# Patient Record
Sex: MALE | Race: WHITE | HISPANIC OR LATINO | Employment: FULL TIME | URBAN - METROPOLITAN AREA
[De-identification: names, ages, dates, MRNs, and addresses within clinical notes are randomized per-mention and may not be internally consistent; named-entity substitution may affect disease eponyms.]

---

## 2020-03-25 ENCOUNTER — HOSPITAL ENCOUNTER (EMERGENCY)
Facility: HOSPITAL | Age: 25
Discharge: HOME | End: 2020-03-25
Attending: EMERGENCY MEDICINE
Payer: COMMERCIAL

## 2020-03-25 VITALS
OXYGEN SATURATION: 99 % | TEMPERATURE: 99 F | WEIGHT: 175 LBS | HEIGHT: 72 IN | DIASTOLIC BLOOD PRESSURE: 80 MMHG | HEART RATE: 80 BPM | RESPIRATION RATE: 18 BRPM | SYSTOLIC BLOOD PRESSURE: 137 MMHG | BODY MASS INDEX: 23.7 KG/M2

## 2020-03-25 DIAGNOSIS — T63.481A INSECT STINGS, ACCIDENTAL OR UNINTENTIONAL, INITIAL ENCOUNTER: Primary | ICD-10-CM

## 2020-03-25 PROCEDURE — 99282 EMERGENCY DEPT VISIT SF MDM: CPT

## 2020-03-25 RX ORDER — SULFAMETHOXAZOLE AND TRIMETHOPRIM 800; 160 MG/1; MG/1
1 TABLET ORAL 2 TIMES DAILY
Qty: 20 TABLET | Refills: 0 | Status: SHIPPED | OUTPATIENT
Start: 2020-03-25 | End: 2020-04-04

## 2020-03-25 NOTE — ED NOTES
Pt states that he was at work today and felt something bite him on the back of his scalp and that his boss told him that he saw a spider.

## 2020-03-25 NOTE — DISCHARGE INSTRUCTIONS
Bactrim as directed until all gone  You can take a Benadryl if you feel that your itching.  Return for any concerns.  The

## 2020-03-25 NOTE — ED PROVIDER NOTES
Encounter Date: 3/25/2020       History     Chief Complaint   Patient presents with    Abscess     back of head     25-year-old male presents to the emergency department reports that he was working inside he states that his boss told him that he noted a spider on his head he states that he felt a little bump to his scalp so became concerned.  Patient reports immunizations are up-to-date        Review of patient's allergies indicates:   Allergen Reactions    Clindamycin      Past Medical History:   Diagnosis Date    Hepatitis C      No past surgical history on file.  No family history on file.  Social History     Tobacco Use    Smoking status: Not on file   Substance Use Topics    Alcohol use: Not on file    Drug use: Not on file     Review of Systems   Constitutional: Negative.    HENT: Negative.    Eyes: Negative.    Respiratory: Negative.    Cardiovascular: Negative.    Gastrointestinal: Negative.    Genitourinary: Negative.    Skin: Positive for wound.   Neurological: Negative.    Hematological: Negative.    Psychiatric/Behavioral: Negative.    All other systems reviewed and are negative.      Physical Exam     Initial Vitals [03/25/20 1344]   BP Pulse Resp Temp SpO2   (!) 162/90 92 16 99.2 °F (37.3 °C) 99 %      MAP       --         Physical Exam    Nursing note and vitals reviewed.  Constitutional: He appears well-developed and well-nourished.   HENT:   Head: Normocephalic and atraumatic.   Right Ear: External ear normal.   Left Ear: External ear normal.   Nose: Nose normal.   Mouth/Throat: Oropharynx is clear and moist.   Eyes: Conjunctivae and EOM are normal. Pupils are equal, round, and reactive to light.   Neck: Normal range of motion. Neck supple.   Cardiovascular: Normal rate, regular rhythm, normal heart sounds and intact distal pulses.   Pulmonary/Chest: Breath sounds normal.   Abdominal: Soft. Bowel sounds are normal.   Musculoskeletal: Normal range of motion.   Neurological: He is alert and  oriented to person, place, and time. He has normal strength.   Skin:   Patient has small reddened area to the posterior scalp possible related to insect sting versus lymph node reaction.  There is no definitive abscess to be drained at this time         ED Course   Procedures  Labs Reviewed - No data to display       Imaging Results    None                                          Clinical Impression:       ICD-10-CM ICD-9-CM   1. Insect stings, accidental or unintentional, initial encounter T63.481A 989.5     E905.5                                JOSELINE Boo  03/25/20 142

## 2020-04-26 ENCOUNTER — HOSPITAL ENCOUNTER (EMERGENCY)
Facility: HOSPITAL | Age: 25
Discharge: PSYCHIATRIC OR SUBSTANCE USE FACILITY | End: 2020-04-27
Attending: EMERGENCY MEDICINE
Payer: COMMERCIAL

## 2020-04-26 ENCOUNTER — HOSPITAL ENCOUNTER (EMERGENCY)
Facility: HOSPITAL | Age: 25
Discharge: HOME | End: 2020-04-26
Attending: EMERGENCY MEDICINE
Payer: COMMERCIAL

## 2020-04-26 VITALS
BODY MASS INDEX: 24.38 KG/M2 | TEMPERATURE: 99 F | RESPIRATION RATE: 20 BRPM | DIASTOLIC BLOOD PRESSURE: 77 MMHG | WEIGHT: 180 LBS | HEIGHT: 72 IN | OXYGEN SATURATION: 98 % | SYSTOLIC BLOOD PRESSURE: 160 MMHG | HEART RATE: 115 BPM

## 2020-04-26 DIAGNOSIS — R45.851 SUICIDAL IDEATION: Primary | ICD-10-CM

## 2020-04-26 DIAGNOSIS — F19.10 POLYSUBSTANCE ABUSE: Primary | ICD-10-CM

## 2020-04-26 PROCEDURE — 80307 DRUG TEST PRSMV CHEM ANLYZR: CPT

## 2020-04-26 PROCEDURE — 84439 ASSAY OF FREE THYROXINE: CPT

## 2020-04-26 PROCEDURE — 99283 EMERGENCY DEPT VISIT LOW MDM: CPT

## 2020-04-26 PROCEDURE — 80320 DRUG SCREEN QUANTALCOHOLS: CPT

## 2020-04-26 PROCEDURE — 84443 ASSAY THYROID STIM HORMONE: CPT

## 2020-04-26 PROCEDURE — 85025 COMPLETE CBC W/AUTO DIFF WBC: CPT

## 2020-04-26 PROCEDURE — 99285 EMERGENCY DEPT VISIT HI MDM: CPT

## 2020-04-26 PROCEDURE — U0002 COVID-19 LAB TEST NON-CDC: HCPCS

## 2020-04-26 PROCEDURE — 80053 COMPREHEN METABOLIC PANEL: CPT

## 2020-04-26 RX ORDER — NALOXONE HYDROCHLORIDE 4 MG/.1ML
SPRAY NASAL
Qty: 1 EACH | Refills: 0 | Status: SHIPPED | OUTPATIENT
Start: 2020-04-26

## 2020-04-27 VITALS
HEIGHT: 72 IN | BODY MASS INDEX: 23.03 KG/M2 | DIASTOLIC BLOOD PRESSURE: 77 MMHG | OXYGEN SATURATION: 97 % | RESPIRATION RATE: 17 BRPM | SYSTOLIC BLOOD PRESSURE: 125 MMHG | TEMPERATURE: 99 F | HEART RATE: 87 BPM | WEIGHT: 170 LBS

## 2020-04-27 LAB
ALBUMIN SERPL BCP-MCNC: 4.9 G/DL (ref 3.5–5.2)
ALP SERPL-CCNC: 91 U/L (ref 55–135)
ALT SERPL W/O P-5'-P-CCNC: 20 U/L (ref 10–44)
AMPHET+METHAMPHET UR QL: NORMAL
ANION GAP SERPL CALC-SCNC: 5 MMOL/L (ref 8–16)
APAP SERPL-MCNC: <10 UG/ML (ref 10–20)
AST SERPL-CCNC: 24 U/L (ref 10–40)
BARBITURATES UR QL SCN>200 NG/ML: NEGATIVE
BASOPHILS # BLD AUTO: 0.04 K/UL (ref 0–0.2)
BASOPHILS NFR BLD: 0.4 % (ref 0–1.9)
BENZODIAZ UR QL SCN>200 NG/ML: NEGATIVE
BILIRUB SERPL-MCNC: 0.9 MG/DL (ref 0.1–1)
BILIRUB UR QL STRIP: NEGATIVE
BUN SERPL-MCNC: 12 MG/DL (ref 6–20)
BZE UR QL SCN: NEGATIVE
CALCIUM SERPL-MCNC: 9.4 MG/DL (ref 8.7–10.5)
CANNABINOIDS UR QL SCN: NORMAL
CHLORIDE SERPL-SCNC: 100 MMOL/L (ref 95–110)
CLARITY UR: CLEAR
CO2 SERPL-SCNC: 26 MMOL/L (ref 23–29)
COLOR UR: YELLOW
CREAT SERPL-MCNC: 1.1 MG/DL (ref 0.5–1.4)
CREAT UR-MCNC: 177 MG/DL (ref 23–375)
DIFFERENTIAL METHOD: ABNORMAL
EOSINOPHIL # BLD AUTO: 0.1 K/UL (ref 0–0.5)
EOSINOPHIL NFR BLD: 0.9 % (ref 0–8)
ERYTHROCYTE [DISTWIDTH] IN BLOOD BY AUTOMATED COUNT: 13.1 % (ref 11.5–14.5)
EST. GFR  (AFRICAN AMERICAN): >60 ML/MIN/1.73 M^2
EST. GFR  (NON AFRICAN AMERICAN): >60 ML/MIN/1.73 M^2
ETHANOL SERPL-MCNC: <5 MG/DL
GLUCOSE SERPL-MCNC: 138 MG/DL (ref 70–110)
GLUCOSE UR QL STRIP: NEGATIVE
HCT VFR BLD AUTO: 41.3 % (ref 40–54)
HGB BLD-MCNC: 13.7 G/DL (ref 14–18)
HGB UR QL STRIP: NEGATIVE
IMM GRANULOCYTES # BLD AUTO: 0.04 K/UL (ref 0–0.04)
IMM GRANULOCYTES NFR BLD AUTO: 0.4 % (ref 0–0.5)
KETONES UR QL STRIP: ABNORMAL
LEUKOCYTE ESTERASE UR QL STRIP: NEGATIVE
LYMPHOCYTES # BLD AUTO: 1.3 K/UL (ref 1–4.8)
LYMPHOCYTES NFR BLD: 13.1 % (ref 18–48)
MCH RBC QN AUTO: 30.1 PG (ref 27–31)
MCHC RBC AUTO-ENTMCNC: 33.2 G/DL (ref 32–36)
MCV RBC AUTO: 91 FL (ref 82–98)
MONOCYTES # BLD AUTO: 0.7 K/UL (ref 0.3–1)
MONOCYTES NFR BLD: 7.7 % (ref 4–15)
NEUTROPHILS # BLD AUTO: 7.4 K/UL (ref 1.8–7.7)
NEUTROPHILS NFR BLD: 77.5 % (ref 38–73)
NITRITE UR QL STRIP: NEGATIVE
NRBC BLD-RTO: 0 /100 WBC
OPIATES UR QL SCN: NORMAL
PCP UR QL SCN>25 NG/ML: NEGATIVE
PH UR STRIP: 6 [PH] (ref 5–8)
PLATELET # BLD AUTO: 198 K/UL (ref 150–350)
PMV BLD AUTO: 10.3 FL (ref 9.2–12.9)
POTASSIUM SERPL-SCNC: 3 MMOL/L (ref 3.5–5.1)
PROT SERPL-MCNC: 7.8 G/DL (ref 6–8.4)
PROT UR QL STRIP: NEGATIVE
RBC # BLD AUTO: 4.55 M/UL (ref 4.6–6.2)
SALICYLATES SERPL-MCNC: <4 MG/DL (ref 15–30)
SARS-COV-2 RDRP RESP QL NAA+PROBE: NEGATIVE
SODIUM SERPL-SCNC: 131 MMOL/L (ref 136–145)
SP GR UR STRIP: 1.01 (ref 1–1.03)
T4 FREE SERPL-MCNC: 1.16 NG/DL (ref 0.71–1.51)
TOXICOLOGY INFORMATION: NORMAL
TSH SERPL DL<=0.005 MIU/L-ACNC: 0.3 UIU/ML (ref 0.34–5.6)
URN SPEC COLLECT METH UR: ABNORMAL
UROBILINOGEN UR STRIP-ACNC: ABNORMAL EU/DL
WBC # BLD AUTO: 9.59 K/UL (ref 3.9–12.7)

## 2020-04-27 PROCEDURE — 80307 DRUG TEST PRSMV CHEM ANLYZR: CPT

## 2020-04-27 PROCEDURE — 81003 URINALYSIS AUTO W/O SCOPE: CPT

## 2020-04-27 RX ORDER — POTASSIUM CHLORIDE 20 MEQ/1
40 TABLET, EXTENDED RELEASE ORAL
Status: DISCONTINUED | OUTPATIENT
Start: 2020-04-27 | End: 2020-04-27 | Stop reason: HOSPADM

## 2020-04-27 NOTE — DISCHARGE INSTRUCTIONS
You must stop using drugs as it is very deleterious to your health and you risk accidental overdose.  Narcan prescribed in case of accidental overdose of opioids.

## 2020-04-27 NOTE — ED PROVIDER NOTES
"Encounter Date: 4/26/2020    SCRIBE #1 NOTE: I, Rocio Elder, am scribing for, and in the presence of, Yuniel Eller MD.       History     Chief Complaint   Patient presents with    Drug / Alcohol Assessment       Time seen by provider: 10:21 PM on 04/26/2020    Dima Enamorado is a 25 y.o. male with PMHx of hepatitis C who presents to the ED via EMS for drug/ alcohol assessment. He notes that for the past several days he has been using heroin and methamphetamines alternatively. He endorses using meth ~ 30 min prior to calling 911. He states that his main concern is drug overdose and wishes to be admitted to a drug rehabilitation facility or psychiatric facility.  He notes he was recently kicked out of a drug rehab in [facility] for "fraternizing with the women." He notes having previous history of rehabilitation admission. Denies suicidal or homicidal ideation, hallucincation, visual disturbances, nausea, vomiting, headaches, or any other related symptoms at this time. No PSHx    The history is provided by the patient.     Review of patient's allergies indicates:   Allergen Reactions    Clindamycin      Past Medical History:   Diagnosis Date    Hepatitis C      History reviewed. No pertinent surgical history.  History reviewed. No pertinent family history.  Social History     Tobacco Use    Smoking status: Current Every Day Smoker    Smokeless tobacco: Never Used   Substance Use Topics    Alcohol use: Not on file    Drug use: Yes     Types: Methamphetamines, IV     Comment: Herion     Review of Systems   Constitutional: Negative for activity change, appetite change, chills, fatigue and fever.   Eyes: Negative for visual disturbance.   Respiratory: Negative for apnea and shortness of breath.    Cardiovascular: Negative for chest pain and palpitations.   Gastrointestinal: Negative for abdominal distention, abdominal pain, nausea and vomiting.   Genitourinary: Negative for difficulty urinating. "   Musculoskeletal: Negative for neck pain.   Skin: Negative for pallor and rash.   Neurological: Negative for headaches.   Hematological: Does not bruise/bleed easily.   Psychiatric/Behavioral: Negative for agitation.       Physical Exam     Initial Vitals [04/26/20 2224]   BP Pulse Resp Temp SpO2   (!) 160/77 (!) 118 20 98.6 °F (37 °C) 97 %      MAP       --         Physical Exam    Nursing note and vitals reviewed.  Constitutional: He appears well-developed and well-nourished. He is not diaphoretic. No distress.   HENT:   Head: Normocephalic and atraumatic.   Mouth/Throat: Oropharynx is clear and moist.   Eyes: Conjunctivae are normal.   Neck: Neck supple.   Cardiovascular: Regular rhythm, normal heart sounds and intact distal pulses. Tachycardia present.  Exam reveals no gallop and no friction rub.    No murmur heard.  Pulmonary/Chest: Breath sounds normal. He has no wheezes. He has no rhonchi. He has no rales.   Abdominal: Soft. He exhibits no distension. There is no tenderness. There is no guarding.   Musculoskeletal: Normal range of motion. He exhibits no edema.   Neurological: He is alert and oriented to person, place, and time. He has normal strength. No cranial nerve deficit or sensory deficit.   Normal gait.   Skin: No rash noted. No erythema.   Visible trac marks evidnent in antecubital fossa bilaterally.   Psychiatric: Judgment and thought content normal. His mood appears anxious.   No SI, HI, hallucinations, or delusions.         ED Course   Procedures  Labs Reviewed - No data to display       Imaging Results    None          Medical Decision Making:   History:   Old Medical Records: I decided to obtain old medical records.            Scribe Attestation:   Scribe #1: I performed the above scribed service and the documentation accurately describes the services I performed. I attest to the accuracy of the note.    I, Dr. Yuniel Eller, personally performed the services described in this  documentation. All medical record entries made by the scribe were at my direction and in my presence.  I have reviewed the chart and agree that the record reflects my personal performance and is accurate and complete. Yuniel Eller MD.  11:29 PM 04/26/2020    Dima Enamorado is a 25 y.o. male presenting with ongoing polysubstance abuse.  Patient is requesting drug rehab.  I have provided him a list of resources.  There is no separate psychiatric emergency to justify PEC.  I did warn the patient continued drug abuse will be very harmful to his health and he is at high risk for overdose if he continues.  No sign of other acute medical process requiring stabilization.  He has no other symptoms.  Mild tachycardia likely due to recent admitted methamphetamine abuse.  Naloxone given to be used as necessary for accidental overdose since he also does abuse opioids.  Go to drug rehab as soon as possible.  Return precautions reviewed in detail.                      Clinical Impression:       ICD-10-CM ICD-9-CM   1. Polysubstance abuse F19.10 305.90             ED Disposition Condition    Discharge Stable        ED Prescriptions     Medication Sig Dispense Start Date End Date Auth. Provider    naloxone (NARCAN) 4 mg/actuation Spry 4mg by nasal route as needed for opioid overdose; may repeat every 2-3 minutes in alternating nostrils until medical help arrives. Call 911 1 each 4/26/2020  Yuniel Eller MD        Follow-up Information     Follow up With Specialties Details Why Contact Info    Drug rehab, ASAP--tomorrow                                     Yuniel Eller MD  04/26/20 3626

## 2020-04-27 NOTE — ED NOTES
Pt states he is having thoughts of killing himself. He states he does not have a plan at this moment. He says whatever pops in his head is what he is going to do. Pt took meth and heroin today. Pt is alert or oriented. Pt is in no acute distress.

## 2020-04-27 NOTE — ED PROVIDER NOTES
Encounter Date: 4/26/2020       History     Chief Complaint   Patient presents with    Mental Health Problem     Patient here [place] [employer] with reported suicidal thoughts patient has a history of methamphetamine and heroin abuse was seen earlier at [facility] and released patient continues to complain of suicidal thoughts and was brought to the [facility] for further evaluation patient states he has no plan he does report visual hallucinations denies any auditory hallucinations he denies any chest pain or shortness of breath states he did use methamphetamines earlier today patient has been hospitalized multiple times in the past his last hospitalization was approximately last year patient has no plans states he has attempted to harm himself in the past        Review of patient's allergies indicates:   Allergen Reactions    Clindamycin      Past Medical History:   Diagnosis Date    Hepatitis C      No past surgical history on file.  No family history on file.  Social History     Tobacco Use    Smoking status: Current Every Day Smoker    Smokeless tobacco: Never Used   Substance Use Topics    Alcohol use: Not on file    Drug use: Yes     Types: Methamphetamines, IV     Comment: Herion     Review of Systems   Constitutional: Negative.    HENT: Negative.    Eyes: Negative.    Respiratory: Negative.    Cardiovascular: Negative.    Gastrointestinal: Negative.    Endocrine: Negative.    Genitourinary: Negative.    Musculoskeletal: Negative.    Skin: Negative.    Allergic/Immunologic: Negative.    Neurological: Negative.    Hematological: Negative.    Psychiatric/Behavioral: Positive for dysphoric mood, hallucinations and suicidal ideas. Negative for agitation and confusion.       Physical Exam     Initial Vitals [04/26/20 2334]   BP Pulse Resp Temp SpO2   (!) 166/88 (!) 113 18 100 °F (37.8 °C) 98 %      MAP       --         Physical Exam    Constitutional: He appears well-developed and  well-nourished. No distress.   HENT:   Head: Normocephalic and atraumatic.   Right Ear: External ear normal.   Left Ear: External ear normal.   Nose: Nose normal.   Mouth/Throat: Oropharynx is clear and moist.   Eyes: Conjunctivae and EOM are normal. Pupils are equal, round, and reactive to light.   Neck: Normal range of motion. Neck supple.   Cardiovascular: Regular rhythm, normal heart sounds and intact distal pulses.   Tachycardic   Pulmonary/Chest: Breath sounds normal. No respiratory distress.   Abdominal: Soft. Bowel sounds are normal. He exhibits no distension. There is no tenderness.   Musculoskeletal: Normal range of motion. He exhibits no edema or tenderness.   Neurological: He is alert and oriented to person, place, and time. He has normal strength. GCS score is 15. GCS eye subscore is 4. GCS verbal subscore is 5. GCS motor subscore is 6.   Skin: Skin is warm and dry. Capillary refill takes less than 2 seconds.   Psychiatric: His speech is normal. He is withdrawn and actively hallucinating. Thought content is not paranoid and not delusional. Cognition and memory are normal. He expresses impulsivity and inappropriate judgment. He exhibits a depressed mood. He expresses suicidal ideation. He expresses no suicidal plans. He is attentive.         ED Course   Procedures  Labs Reviewed   CBC W/ AUTO DIFFERENTIAL   COMPREHENSIVE METABOLIC PANEL   TSH   URINALYSIS, REFLEX TO URINE CULTURE   DRUG SCREEN PANEL, URINE EMERGENCY   ALCOHOL,MEDICAL (ETHANOL)   ACETAMINOPHEN LEVEL   SALICYLATE LEVEL   SARS-COV-2 RNA AMPLIFICATION, QUAL          Imaging Results    None          Medical Decision Making:   ED Management:  Patient here with reported polysubstance abuse depression with suicidal ideations will transfer for inpatient psych treatment PC completed patient medically clear and stable for inpatient psychiatric treatment                                 Clinical Impression:  B       ICD-10-CM ICD-9-CM   1. Suicidal  [facility] R45.851 V62.84                                Santy Hannah MD  04/27/20 0131       Santy Hannah MD  04/27/20 0618

## 2020-04-27 NOTE — ED NOTES
Sitter continues to sit with the patient for continuous visual contact and every 15 minute checks. Resting comfortably, calm, and cooperative. Respirations even and unlabored. No distress noted.

## 2020-07-05 ENCOUNTER — HOSPITAL ENCOUNTER (EMERGENCY)
Facility: HOSPITAL | Age: 25
Discharge: HOME | End: 2020-07-05
Attending: EMERGENCY MEDICINE
Payer: COMMERCIAL

## 2020-07-05 VITALS
OXYGEN SATURATION: 99 % | BODY MASS INDEX: 23.03 KG/M2 | SYSTOLIC BLOOD PRESSURE: 125 MMHG | HEIGHT: 72 IN | HEART RATE: 89 BPM | TEMPERATURE: 98 F | WEIGHT: 170 LBS | DIASTOLIC BLOOD PRESSURE: 80 MMHG | RESPIRATION RATE: 16 BRPM

## 2020-07-05 DIAGNOSIS — G56.32 SATURDAY NIGHT PALSY, LEFT: Primary | ICD-10-CM

## 2020-07-05 DIAGNOSIS — G56.30 RADIAL NERVE PALSY: ICD-10-CM

## 2020-07-05 PROCEDURE — 99282 EMERGENCY DEPT VISIT SF MDM: CPT

## 2020-07-05 NOTE — Clinical Note
Dima Enamorado was seen and treated in our emergency department on 7/5/2020.  He may return to work on 07/08/2020.       If you have any questions or concerns, please don't hesitate to call.      Rosalio Londono MD

## 2020-07-05 NOTE — Clinical Note
Dima Enamorado was seen and treated in our emergency department on 7/5/2020.  He may return to work after being cleared by follow-up physician 07/08/2020.       If you have any questions or concerns, please don't hesitate to call.      Rosalio Londono MD

## 2020-07-06 NOTE — ED PROVIDER NOTES
Encounter Date: 7/5/2020       History     Chief Complaint   Patient presents with    Numbness     tingling in left arm     Patient presents complaining of tingling and weakness to the left arm.  Patient states he awoke with the symptoms this morning.  Patient states he laid on his arm last night.  He denies drinking any alcohol.  At the worst symptoms are moderate to severe.  He has not had this before.  Nothing makes it better or worse.        Review of patient's allergies indicates:   Allergen Reactions    Clindamycin      Past Medical History:   Diagnosis Date    Hepatitis C      No past surgical history on file.  No family history on file.  Social History     Tobacco Use    Smoking status: Current Every Day Smoker    Smokeless tobacco: Never Used   Substance Use Topics    Alcohol use: Not on file    Drug use: Yes     Types: Methamphetamines, IV     Comment: Herion     Review of Systems   Neurological: Positive for weakness. Negative for seizures, facial asymmetry, speech difficulty, light-headedness and headaches.   All other systems reviewed and are negative.      Physical Exam     Initial Vitals [07/05/20 1957]   BP Pulse Resp Temp SpO2   125/80 89 16 98 °F (36.7 °C) 99 %      MAP       --         Physical Exam    Nursing note and vitals reviewed.  Constitutional: He appears well-developed and well-nourished. He is not diaphoretic. No distress.   HENT:   Head: Normocephalic and atraumatic.   Eyes: EOM are normal.   Neck: Normal range of motion. Neck supple.   Pulmonary/Chest: No respiratory distress.   Neurological: He is alert and oriented to person, place, and time. He displays normal reflexes. No cranial nerve deficit.   Patient has a radial nerve palsy.  He has wrist drop to the left upper extremity.  He has marked decreased sensation especially over the dorsal surface of the left upper extremity.  His sensation is normal on the palmar surface of the upper extremity.   Skin: Skin is warm and dry.    Psychiatric: He has a normal mood and affect. His behavior is normal. Judgment and thought content normal.         ED Course   Procedures  Labs Reviewed - No data to display       Imaging Results    None          Medical Decision Making:   ED Management:  I discussed the case with Dr. Sabillon who agrees if patient has decreased sensation to the dorsal surface of the hand and arm and good sensation to the palmar surface it is likely a radial nerve palsy.  I do not think this is an acute stroke.  I did advised patient early neuro follow-up within the next 1-2 days and a wrist splint.  Patient has no neck pain or any neck trauma.  I recommended nerve conduction study.  Patient be discharged stable condition.  He was given detailed return precautions.                                 Clinical Impression:       ICD-10-CM ICD-9-CM   1. Saturday night palsy, left  G56.32 354.3   2. Radial nerve palsy  G56.30 354.3             ED Disposition Condition    Discharge Stable        ED Prescriptions     None        Follow-up Information     Follow up With Specialties Details Why Contact Info    Abhilash Sabillon MD Neurology In 1 day  [address] [place] [address]  [phone]                                       Rosalio Londono MD  07/05/20 3683

## 2020-07-06 NOTE — DISCHARGE INSTRUCTIONS
Symptoms today are most consistent with a nerve compression palsy.  If you experience numbness to the face, leg, difficulty speaking, blurry vision this could be a stroke you should return to the ER immediately.  You will need a nerve conduction study with a neurologist within the next 1-2 days.  Please call and make an appointment with the neurologist on the discharge paperwork.

## 2020-10-28 ENCOUNTER — HOSPITAL ENCOUNTER (EMERGENCY)
Facility: HOSPITAL | Age: 25
Discharge: PSYCHIATRIC OR SUBSTANCE USE FACILITY | End: 2020-10-29
Attending: EMERGENCY MEDICINE
Payer: COMMERCIAL

## 2020-10-28 DIAGNOSIS — Z00.8 MEDICAL CLEARANCE FOR PSYCHIATRIC ADMISSION: ICD-10-CM

## 2020-10-28 DIAGNOSIS — F19.10 POLYSUBSTANCE ABUSE: ICD-10-CM

## 2020-10-28 DIAGNOSIS — R45.851 SUICIDAL IDEATION: Primary | ICD-10-CM

## 2020-10-28 DIAGNOSIS — R00.0 TACHYCARDIA: ICD-10-CM

## 2020-10-28 LAB
ALBUMIN SERPL BCP-MCNC: 4.7 G/DL (ref 3.5–5.2)
ALP SERPL-CCNC: 73 U/L (ref 55–135)
ALT SERPL W/O P-5'-P-CCNC: 18 U/L (ref 10–44)
AMPHET+METHAMPHET UR QL: ABNORMAL
ANION GAP SERPL CALC-SCNC: 13 MMOL/L (ref 8–16)
APAP SERPL-MCNC: <10 UG/ML (ref 10–20)
AST SERPL-CCNC: 21 U/L (ref 10–40)
BACTERIA #/AREA URNS HPF: NEGATIVE /HPF
BARBITURATES UR QL SCN>200 NG/ML: NEGATIVE
BASOPHILS # BLD AUTO: 0.02 K/UL (ref 0–0.2)
BASOPHILS NFR BLD: 0.2 % (ref 0–1.9)
BENZODIAZ UR QL SCN>200 NG/ML: NEGATIVE
BILIRUB SERPL-MCNC: 1.1 MG/DL (ref 0.1–1)
BILIRUB UR QL STRIP: NEGATIVE
BUN SERPL-MCNC: 18 MG/DL (ref 6–20)
BZE UR QL SCN: NEGATIVE
CALCIUM SERPL-MCNC: 10 MG/DL (ref 8.7–10.5)
CANNABINOIDS UR QL SCN: ABNORMAL
CHLORIDE SERPL-SCNC: 100 MMOL/L (ref 95–110)
CK SERPL-CCNC: 230 U/L (ref 20–200)
CLARITY UR: CLEAR
CO2 SERPL-SCNC: 24 MMOL/L (ref 23–29)
COLOR UR: YELLOW
CREAT SERPL-MCNC: 1.3 MG/DL (ref 0.5–1.4)
CREAT UR-MCNC: 513 MG/DL (ref 23–375)
DIFFERENTIAL METHOD: NORMAL
EOSINOPHIL # BLD AUTO: 0.1 K/UL (ref 0–0.5)
EOSINOPHIL NFR BLD: 0.6 % (ref 0–8)
ERYTHROCYTE [DISTWIDTH] IN BLOOD BY AUTOMATED COUNT: 12.9 % (ref 11.5–14.5)
EST. GFR  (AFRICAN AMERICAN): >60 ML/MIN/1.73 M^2
EST. GFR  (NON AFRICAN AMERICAN): >60 ML/MIN/1.73 M^2
ETHANOL SERPL-MCNC: <5 MG/DL
GLUCOSE SERPL-MCNC: 100 MG/DL (ref 70–110)
GLUCOSE UR QL STRIP: NEGATIVE
HCT VFR BLD AUTO: 43.2 % (ref 40–54)
HGB BLD-MCNC: 14.3 G/DL (ref 14–18)
HGB UR QL STRIP: NEGATIVE
HYALINE CASTS #/AREA URNS LPF: 12 /LPF
IMM GRANULOCYTES # BLD AUTO: 0.04 K/UL (ref 0–0.04)
IMM GRANULOCYTES NFR BLD AUTO: 0.4 % (ref 0–0.5)
KETONES UR QL STRIP: ABNORMAL
LEUKOCYTE ESTERASE UR QL STRIP: NEGATIVE
LIPASE SERPL-CCNC: 16 U/L (ref 4–60)
LYMPHOCYTES # BLD AUTO: 2.4 K/UL (ref 1–4.8)
LYMPHOCYTES NFR BLD: 23.3 % (ref 18–48)
MCH RBC QN AUTO: 29.9 PG (ref 27–31)
MCHC RBC AUTO-ENTMCNC: 33.1 G/DL (ref 32–36)
MCV RBC AUTO: 90 FL (ref 82–98)
MICROSCOPIC COMMENT: ABNORMAL
MONOCYTES # BLD AUTO: 0.9 K/UL (ref 0.3–1)
MONOCYTES NFR BLD: 8.9 % (ref 4–15)
NEUTROPHILS # BLD AUTO: 6.8 K/UL (ref 1.8–7.7)
NEUTROPHILS NFR BLD: 66.6 % (ref 38–73)
NITRITE UR QL STRIP: NEGATIVE
NRBC BLD-RTO: 0 /100 WBC
OPIATES UR QL SCN: NEGATIVE
PCP UR QL SCN>25 NG/ML: NEGATIVE
PH UR STRIP: 6 [PH] (ref 5–8)
PLATELET # BLD AUTO: 237 K/UL (ref 150–350)
PMV BLD AUTO: 9.5 FL (ref 9.2–12.9)
POTASSIUM SERPL-SCNC: 3.4 MMOL/L (ref 3.5–5.1)
PROT SERPL-MCNC: 8.1 G/DL (ref 6–8.4)
PROT UR QL STRIP: ABNORMAL
RBC # BLD AUTO: 4.79 M/UL (ref 4.6–6.2)
RBC #/AREA URNS HPF: 3 /HPF (ref 0–4)
SALICYLATES SERPL-MCNC: <4 MG/DL (ref 15–30)
SARS-COV-2 RDRP RESP QL NAA+PROBE: NEGATIVE
SODIUM SERPL-SCNC: 137 MMOL/L (ref 136–145)
SP GR UR STRIP: >1.03 (ref 1–1.03)
SQUAMOUS #/AREA URNS HPF: 1 /HPF
TOXICOLOGY INFORMATION: ABNORMAL
TSH SERPL DL<=0.005 MIU/L-ACNC: 1.79 UIU/ML (ref 0.34–5.6)
URN SPEC COLLECT METH UR: ABNORMAL
UROBILINOGEN UR STRIP-ACNC: ABNORMAL EU/DL
WBC # BLD AUTO: 10.14 K/UL (ref 3.9–12.7)
WBC #/AREA URNS HPF: 1 /HPF (ref 0–5)

## 2020-10-28 PROCEDURE — 93005 ELECTROCARDIOGRAM TRACING: CPT | Performed by: INTERNAL MEDICINE

## 2020-10-28 PROCEDURE — 80307 DRUG TEST PRSMV CHEM ANLYZR: CPT

## 2020-10-28 PROCEDURE — 93010 ELECTROCARDIOGRAM REPORT: CPT | Mod: ,,, | Performed by: INTERNAL MEDICINE

## 2020-10-28 PROCEDURE — 99285 EMERGENCY DEPT VISIT HI MDM: CPT | Mod: 25

## 2020-10-28 PROCEDURE — 63600175 PHARM REV CODE 636 W HCPCS: Performed by: EMERGENCY MEDICINE

## 2020-10-28 PROCEDURE — 84443 ASSAY THYROID STIM HORMONE: CPT

## 2020-10-28 PROCEDURE — 80320 DRUG SCREEN QUANTALCOHOLS: CPT

## 2020-10-28 PROCEDURE — 82550 ASSAY OF CK (CPK): CPT

## 2020-10-28 PROCEDURE — 93010 EKG 12-LEAD: ICD-10-PCS | Mod: ,,, | Performed by: INTERNAL MEDICINE

## 2020-10-28 PROCEDURE — 96361 HYDRATE IV INFUSION ADD-ON: CPT

## 2020-10-28 PROCEDURE — U0002 COVID-19 LAB TEST NON-CDC: HCPCS

## 2020-10-28 PROCEDURE — 83690 ASSAY OF LIPASE: CPT

## 2020-10-28 PROCEDURE — 96374 THER/PROPH/DIAG INJ IV PUSH: CPT

## 2020-10-28 PROCEDURE — 80053 COMPREHEN METABOLIC PANEL: CPT

## 2020-10-28 PROCEDURE — 85025 COMPLETE CBC W/AUTO DIFF WBC: CPT

## 2020-10-28 PROCEDURE — 81001 URINALYSIS AUTO W/SCOPE: CPT

## 2020-10-28 RX ORDER — ONDANSETRON 2 MG/ML
4 INJECTION INTRAMUSCULAR; INTRAVENOUS
Status: COMPLETED | OUTPATIENT
Start: 2020-10-28 | End: 2020-10-28

## 2020-10-28 RX ADMIN — SODIUM CHLORIDE, SODIUM LACTATE, POTASSIUM CHLORIDE, AND CALCIUM CHLORIDE 1000 ML: .6; .31; .03; .02 INJECTION, SOLUTION INTRAVENOUS at 09:10

## 2020-10-28 RX ADMIN — ONDANSETRON 4 MG: 2 INJECTION INTRAMUSCULAR; INTRAVENOUS at 09:10

## 2020-10-29 VITALS
HEART RATE: 91 BPM | BODY MASS INDEX: 26.52 KG/M2 | HEIGHT: 66 IN | WEIGHT: 165 LBS | SYSTOLIC BLOOD PRESSURE: 118 MMHG | OXYGEN SATURATION: 100 % | TEMPERATURE: 99 F | RESPIRATION RATE: 14 BRPM | DIASTOLIC BLOOD PRESSURE: 78 MMHG

## 2020-10-29 PROBLEM — F32.A DEPRESSION: Status: ACTIVE | Noted: 2020-10-29

## 2020-10-30 PROBLEM — E87.6 HYPOKALEMIA: Status: ACTIVE | Noted: 2020-10-30

## 2020-10-30 PROBLEM — F32.A FATIGUE DUE TO DEPRESSION: Status: ACTIVE | Noted: 2020-10-30

## 2020-10-30 PROBLEM — R53.83 FATIGUE DUE TO DEPRESSION: Status: ACTIVE | Noted: 2020-10-30

## 2020-11-01 PROBLEM — R53.83 FATIGUE DUE TO DEPRESSION: Status: RESOLVED | Noted: 2020-10-30 | Resolved: 2020-11-01

## 2020-11-01 PROBLEM — F32.A FATIGUE DUE TO DEPRESSION: Status: RESOLVED | Noted: 2020-10-30 | Resolved: 2020-11-01

## 2020-11-01 PROBLEM — E87.6 HYPOKALEMIA: Status: RESOLVED | Noted: 2020-10-30 | Resolved: 2020-11-01

## 2020-11-01 PROBLEM — F32.A DEPRESSION: Status: RESOLVED | Noted: 2020-10-29 | Resolved: 2020-11-01
